# Patient Record
Sex: FEMALE | URBAN - METROPOLITAN AREA
[De-identification: names, ages, dates, MRNs, and addresses within clinical notes are randomized per-mention and may not be internally consistent; named-entity substitution may affect disease eponyms.]

---

## 2023-07-03 ENCOUNTER — APPOINTMENT (RX ONLY)
Dept: URBAN - METROPOLITAN AREA CLINIC 93 | Facility: CLINIC | Age: 38
Setting detail: DERMATOLOGY
End: 2023-07-03

## 2023-07-03 DIAGNOSIS — L65.0 TELOGEN EFFLUVIUM: ICD-10-CM | Status: INADEQUATELY CONTROLLED

## 2023-07-03 PROCEDURE — ? SUNSCREEN RECOMMENDATIONS

## 2023-07-03 PROCEDURE — ? ORDER TESTS

## 2023-07-03 PROCEDURE — 99204 OFFICE O/P NEW MOD 45 MIN: CPT

## 2023-07-03 PROCEDURE — ? ADDITIONAL NOTES

## 2023-07-03 PROCEDURE — ? COUNSELING

## 2023-07-03 NOTE — PROCEDURE: ADDITIONAL NOTES
Additional Notes: Patient has history of breast cancer/lumpectomy as well as thyroidectomy that has been supplemented with levothyroxine and iron deficiency. She presents today with long-standing diffuse hair shedding.
Detail Level: Simple
Render Risk Assessment In Note?: no

## 2023-08-14 ENCOUNTER — APPOINTMENT (RX ONLY)
Dept: URBAN - METROPOLITAN AREA CLINIC 93 | Facility: CLINIC | Age: 38
Setting detail: DERMATOLOGY
End: 2023-08-14

## 2023-08-14 DIAGNOSIS — L65.0 TELOGEN EFFLUVIUM: ICD-10-CM | Status: INADEQUATELY CONTROLLED

## 2023-08-14 PROCEDURE — ? COUNSELING

## 2023-08-14 PROCEDURE — ? SUNSCREEN RECOMMENDATIONS

## 2023-08-14 PROCEDURE — ? ADDITIONAL NOTES

## 2023-08-14 PROCEDURE — ? LAB REPORTS REVIEWED

## 2023-08-14 PROCEDURE — 99214 OFFICE O/P EST MOD 30 MIN: CPT

## 2023-08-14 NOTE — PROCEDURE: LAB REPORTS REVIEWED
Summarized Lab Results: Positive AMISHA- patient has upcoming appointment with rheumatology for further evaluation
Detail Level: Zone

## 2023-08-14 NOTE — PROCEDURE: ADDITIONAL NOTES
Additional Notes: Patient has history of breast cancer/lumpectomy as well as thyroidectomy that has been supplemented with levothyroxine and iron deficiency. She is following up today with long-standing diffuse hair shedding. She states she has another upcoming surgery as well as starting Tamoxifen
Detail Level: Simple
Render Risk Assessment In Note?: no

## 2023-12-21 ENCOUNTER — APPOINTMENT (RX ONLY)
Dept: URBAN - METROPOLITAN AREA CLINIC 93 | Facility: CLINIC | Age: 38
Setting detail: DERMATOLOGY
End: 2023-12-21

## 2023-12-21 DIAGNOSIS — L82.1 OTHER SEBORRHEIC KERATOSIS: ICD-10-CM

## 2023-12-21 DIAGNOSIS — L98.8 OTHER SPECIFIED DISORDERS OF THE SKIN AND SUBCUTANEOUS TISSUE: ICD-10-CM

## 2023-12-21 DIAGNOSIS — Z41.9 ENCOUNTER FOR PROCEDURE FOR PURPOSES OTHER THAN REMEDYING HEALTH STATE, UNSPECIFIED: ICD-10-CM

## 2023-12-21 PROCEDURE — ? ADDITIONAL NOTES

## 2023-12-21 PROCEDURE — ? COSMETIC QUOTE

## 2023-12-21 PROCEDURE — ? COUNSELING

## 2023-12-21 PROCEDURE — ? PRESCRIPTION

## 2023-12-21 PROCEDURE — ? COSMETIC CONSULTATION: HYDRAFACIAL

## 2023-12-21 PROCEDURE — 99213 OFFICE O/P EST LOW 20 MIN: CPT

## 2023-12-21 RX ORDER — TRETIONIN 0.25 MG/G
CREAM TOPICAL QHS
Qty: 20 | Refills: 11 | COMMUNITY
Start: 2023-12-21

## 2023-12-21 RX ADMIN — TRETIONIN: 0.25 CREAM TOPICAL at 00:00

## 2023-12-21 ASSESSMENT — LOCATION DETAILED DESCRIPTION DERM
LOCATION DETAILED: RIGHT CENTRAL MALAR CHEEK
LOCATION DETAILED: LEFT INFERIOR CENTRAL MALAR CHEEK
LOCATION DETAILED: RIGHT CAVUM CONCHA

## 2023-12-21 ASSESSMENT — LOCATION SIMPLE DESCRIPTION DERM
LOCATION SIMPLE: RIGHT CHEEK
LOCATION SIMPLE: LEFT CHEEK
LOCATION SIMPLE: RIGHT EAR

## 2023-12-21 ASSESSMENT — LOCATION ZONE DERM
LOCATION ZONE: EAR
LOCATION ZONE: FACE

## 2023-12-21 NOTE — PROCEDURE: COSMETIC QUOTE
Facility 2 Price (In Dollars- Use Only Numbers And Decimals): 0.00
Laser 5 Percentage Discount: 0
Detail Level: Zone
Face Procedure 3: VI Purify Precision Plus peel
Face Procedure 1 Price/Unit (In Dollars- Use Only Numbers And Decimals): 265
Include Sales Tax On Anesthesia Fees: No
Face Procedure 3 Price/Unit (In Dollars- Use Only Numbers And Decimals): 429
Face Procedure 2 Units: 1
Face Procedure 2 Percentage Discount: 100
Face Procedure 2 Price/Unit (In Dollars- Use Only Numbers And Decimals): 53.68
Include Sales Tax On Implants: Yes
Face Procedure 1 Units: 3
Face Procedure 1: Deluxe HydraFacial
Face Procedure 1 Percentage Discount: 10

## 2023-12-21 NOTE — PROCEDURE: ADDITIONAL NOTES
Render Risk Assessment In Note?: no
Additional Notes: The patient is present due to concerns uneven skin texture and expressed interest in receiving exfoliating facial treatments with extractions. Upon evaluation of her cosmetic concerns I informed her that it would be in her best interest to  pursue a series of 3 Deluxe HydraFacial treatments performed 2-4 weeks apart for optimal treatment results. I then reviewed the benefits of the 6 step treatment process of our Deluxe HydraFacial treatments. I informed her of the benefits of active ingredients, HydraGlucan booster, and LED light therapy. The patient was quoted $265/Deluxe HydraFacial treatment.\\n\\nThe patient is currently undergoing chemotherapy, due to this I requested that she bring written approval from her oncologist to receive HydraFacial treatment.
Detail Level: Simple